# Patient Record
(demographics unavailable — no encounter records)

---

## 2025-01-31 NOTE — HISTORY OF PRESENT ILLNESS
[FreeTextEntry1] : Sammy presents for evaluation academic delays.  Mom states that Sammy has a difficult time remaining focused in class.  He requires nearly continuous redirection and refocusing.  He requires prompting to initiate work as well as to complete work in the allotted time.  It is unclear where he stands academically as he is inconsistent with his cooperation.  There are times when it appears that he is not listening only to have him repeat what is said to him at a later time.  Socially he does have difficulties understanding social cues and personal space.  At home he is also noted to be impulsive and on the low.  He does respond to redirection but sometimes must be told more than once before he complies.  He has some repetitive motor movements including jumping into space, flapping of the hands and sometimes tapping of the fingers.  He has a tendency to echo what is said to him as well as to repeat phrases that he may have heard at a previous time.  He is able to follow most verbal directions without requiring demonstration.

## 2025-01-31 NOTE — ASSESSMENT
[FreeTextEntry1] : 5-year-old with clinical history and exam that meets DSM-5 criteria for diagnosis of Autism Spectrum Disorder complicated by Attention Deficit Hyperactivity Disorder combined type.  I did discuss initiating stimulants for help with ADHD symptoms but would suggest behavior modification in school to include the following first:  1. Preferential seating in front of classroom and near teacher. Should be away from sources of distraction (i.e. bulletin boards, open windows/doors, facing other students) 2. Verbal, auditory or visual reminders [(i.e. different colored blocks to indicate plenty of time (green), time senior care done (yellow) or nearing end of time(red] 3. Extra time to complete tests and projects 4. Testing in a separate room with few if any other students to reduce distraction 5. 1:1 assistance in form of paraprofessional, speech therapist and/or SEIT to help with focus  For more individualized evaluation and recommendations for best academic environment I am also referring him to developmental pediatrics for further evaluation for autism.

## 2025-01-31 NOTE — PHYSICAL EXAM
[Well-appearing] : well-appearing [Normocephalic] : normocephalic [No dysmorphic facial features] : no dysmorphic facial features [No ocular abnormalities] : no ocular abnormalities [Neck supple] : neck supple [Lungs clear] : lungs clear [Heart sounds regular in rate and rhythm] : heart sounds regular in rate and rhythm [Soft] : soft [Straight] : straight [No calixto or dimples] : no calixto or dimples [No deformities] : no deformities [Alert] : alert [Well related, good eye contact] : well related, good eye contact [Conversant] : conversant [Normal speech and language] : normal speech and language [Follows instructions well] : follows instructions well [VFF] : VFF [Pupils reactive to light and accommodation] : pupils reactive to light and accommodation [Full extraocular movements] : full extraocular movements [Saccadic and smooth pursuits intact] : saccadic and smooth pursuits intact [No nystagmus] : no nystagmus [Normal facial sensation to light touch] : normal facial sensation to light touch [No facial asymmetry or weakness] : no facial asymmetry or weakness [Gross hearing intact] : gross hearing intact [Equal palate elevation] : equal palate elevation [Good shoulder shrug] : good shoulder shrug [Normal tongue movement] : normal tongue movement [Midline tongue, no fasciculations] : midline tongue, no fasciculations [Normal axial and appendicular muscle tone] : normal axial and appendicular muscle tone [Gets up on table without difficulty] : gets up on table without difficulty [No pronator drift] : no pronator drift [Normal finger tapping and fine finger movements] : normal finger tapping and fine finger movements [5/5 strength in proximal and distal muscles of arms and legs] : 5/5 strength in proximal and distal muscles of arms and legs [Walks and runs well] : walks and runs well [Able to do deep knee bend] : able to do deep knee bend [2+ biceps] : 2+ biceps [Triceps] : triceps [Knee jerks] : knee jerks [Ankle jerks] : ankle jerks [No ankle clonus] : no ankle clonus [Localizes LT and temperature] : localizes LT and temperature [Good walking balance] : good walking balance [Normal gait] : normal gait [Able to tandem well] : able to tandem well [de-identified] : Playful and affectionate [de-identified] : Echolalic and perseverative speech [de-identified] : Hyperactive throughout visit, pacing or hopping back and forth, episodic hand flapping and stiffening stims [de-identified] : Intermittent toe walking